# Patient Record
Sex: MALE | Race: BLACK OR AFRICAN AMERICAN | Employment: STUDENT | URBAN - METROPOLITAN AREA
[De-identification: names, ages, dates, MRNs, and addresses within clinical notes are randomized per-mention and may not be internally consistent; named-entity substitution may affect disease eponyms.]

---
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
## 2022-10-07 ENCOUNTER — OFFICE VISIT (OUTPATIENT)
Dept: FAMILY MEDICINE CLINIC | Age: 22
End: 2022-10-07
Payer: MEDICARE

## 2022-10-07 VITALS
TEMPERATURE: 98.6 F | WEIGHT: 201.4 LBS | HEART RATE: 88 BPM | RESPIRATION RATE: 16 BRPM | OXYGEN SATURATION: 99 % | SYSTOLIC BLOOD PRESSURE: 120 MMHG | DIASTOLIC BLOOD PRESSURE: 76 MMHG | BODY MASS INDEX: 24.52 KG/M2 | HEIGHT: 76 IN

## 2022-10-07 DIAGNOSIS — Z00.00 ENCOUNTER FOR ROUTINE HISTORY AND PHYSICAL EXAMINATION: Primary | ICD-10-CM

## 2022-10-07 PROCEDURE — 99385 PREV VISIT NEW AGE 18-39: CPT | Performed by: NURSE PRACTITIONER

## 2022-10-07 PROCEDURE — G8484 FLU IMMUNIZE NO ADMIN: HCPCS | Performed by: NURSE PRACTITIONER

## 2022-10-07 SDOH — ECONOMIC STABILITY: FOOD INSECURITY: WITHIN THE PAST 12 MONTHS, YOU WORRIED THAT YOUR FOOD WOULD RUN OUT BEFORE YOU GOT MONEY TO BUY MORE.: NEVER TRUE

## 2022-10-07 SDOH — ECONOMIC STABILITY: FOOD INSECURITY: WITHIN THE PAST 12 MONTHS, THE FOOD YOU BOUGHT JUST DIDN'T LAST AND YOU DIDN'T HAVE MONEY TO GET MORE.: NEVER TRUE

## 2022-10-07 ASSESSMENT — PATIENT HEALTH QUESTIONNAIRE - PHQ9
SUM OF ALL RESPONSES TO PHQ QUESTIONS 1-9: 0
SUM OF ALL RESPONSES TO PHQ9 QUESTIONS 1 & 2: 0
SUM OF ALL RESPONSES TO PHQ QUESTIONS 1-9: 0
SUM OF ALL RESPONSES TO PHQ QUESTIONS 1-9: 0
2. FEELING DOWN, DEPRESSED OR HOPELESS: 0
SUM OF ALL RESPONSES TO PHQ QUESTIONS 1-9: 0
1. LITTLE INTEREST OR PLEASURE IN DOING THINGS: 0

## 2022-10-07 ASSESSMENT — ENCOUNTER SYMPTOMS
SHORTNESS OF BREATH: 0
COUGH: 0
GASTROINTESTINAL NEGATIVE: 1
CHEST TIGHTNESS: 0
EYES NEGATIVE: 1
ALLERGIC/IMMUNOLOGIC NEGATIVE: 1
WHEEZING: 0

## 2022-10-07 ASSESSMENT — SOCIAL DETERMINANTS OF HEALTH (SDOH): HOW HARD IS IT FOR YOU TO PAY FOR THE VERY BASICS LIKE FOOD, HOUSING, MEDICAL CARE, AND HEATING?: NOT VERY HARD

## 2022-10-07 NOTE — PROGRESS NOTES
Alfonzo Chung  2000  22 y.o. SUBJECT DAVID:    No chief complaint on file. Velia Reagan is a 25year old male who is in for a routine history and physical examination required to play sports at [employer]. He denies recent illness or injury. He denies family medical history of young males in the family who have cardiac events before the age of 27. Current Outpatient Medications on File Prior to Visit   Medication Sig Dispense Refill    ibuprofen (ADVIL;MOTRIN) 100 MG/5ML suspension Take 21 mL by mouth every 6 hours as needed for fever. (Patient not taking: Reported on 10/7/2022)      ibuprofen (ADVIL;MOTRIN) 200 MG tablet Take 2 tablets by mouth every 8 hours as needed for Pain. (Patient not taking: Reported on 10/7/2022) 30 tablet 0     No current facility-administered medications on file prior to visit. History reviewed. No pertinent past medical history. No past surgical history on file.   Family History   Problem Relation Age of Onset    Mental Illness Mother      Social History     Socioeconomic History    Marital status: Single     Spouse name: Not on file    Number of children: Not on file    Years of education: Not on file    Highest education level: Not on file   Occupational History    Not on file   Tobacco Use    Smoking status: Never    Smokeless tobacco: Never   Vaping Use    Vaping Use: Never used   Substance and Sexual Activity    Alcohol use: No    Drug use: Never    Sexual activity: Not Currently   Other Topics Concern    Not on file   Social History Narrative    Not on file     Social Determinants of Health     Financial Resource Strain: Low Risk     Difficulty of Paying Living Expenses: Not very hard   Food Insecurity: No Food Insecurity    Worried About Running Out of Food in the Last Year: Never true    Ran Out of Food in the Last Year: Never true   Transportation Needs: Not on file   Physical Activity: Not on file   Stress: Not on file   Social Connections: Not on file Intimate Partner Violence: Not on file   Housing Stability: Not on file       Review of Systems   Constitutional:  Negative for activity change, appetite change, chills, diaphoresis, fatigue, fever and unexpected weight change. HENT: Negative. Eyes: Negative. Respiratory:  Negative for cough, chest tightness, shortness of breath and wheezing. Cardiovascular:  Negative for chest pain and palpitations. Gastrointestinal: Negative. Endocrine: Negative. Genitourinary: Negative. Musculoskeletal: Negative. Skin: Negative. Allergic/Immunologic: Negative. Neurological: Negative. Hematological: Negative. Psychiatric/Behavioral: Negative. OBJECTIVE:     /76 (Site: Right Upper Arm, Position: Sitting, Cuff Size: Large Adult)   Pulse 88   Temp 98.6 °F (37 °C) (Infrared)   Resp 16   Ht 6' 4\" (1.93 m)   Wt 201 lb 6.4 oz (91.4 kg)   SpO2 99%   BMI 24.52 kg/m²     Physical Exam  Vitals reviewed. Constitutional:       General: He is not in acute distress. Appearance: Normal appearance. He is well-developed. He is not ill-appearing or diaphoretic. HENT:      Head: Normocephalic and atraumatic. Right Ear: Tympanic membrane, ear canal and external ear normal. There is no impacted cerumen. Left Ear: Tympanic membrane, ear canal and external ear normal. There is no impacted cerumen. Nose: Nose normal. No congestion or rhinorrhea. Mouth/Throat:      Mouth: Mucous membranes are moist.      Pharynx: Oropharynx is clear. No oropharyngeal exudate or posterior oropharyngeal erythema. Eyes:      General: No scleral icterus. Right eye: No discharge. Left eye: No discharge. Extraocular Movements: Extraocular movements intact. Conjunctiva/sclera: Conjunctivae normal.      Pupils: Pupils are equal, round, and reactive to light. Cardiovascular:      Rate and Rhythm: Normal rate and regular rhythm. Heart sounds: Normal heart sounds. No murmur heard. No friction rub. No gallop. Pulmonary:      Effort: Pulmonary effort is normal. No respiratory distress. Breath sounds: Normal breath sounds. No wheezing. Chest:      Chest wall: No tenderness. Abdominal:      General: Abdomen is flat. Bowel sounds are normal. There is no distension. Palpations: Abdomen is soft. There is no mass. Tenderness: There is no abdominal tenderness. There is no right CVA tenderness or left CVA tenderness. Hernia: No hernia is present. Musculoskeletal:         General: Normal range of motion. Cervical back: Normal range of motion and neck supple. No rigidity or tenderness. Right lower leg: No edema. Left lower leg: No edema. Lymphadenopathy:      Cervical: No cervical adenopathy. Skin:     General: Skin is warm and dry. Neurological:      Mental Status: He is alert and oriented to person, place, and time. Cranial Nerves: No cranial nerve deficit. Motor: No weakness. Gait: Gait normal.      Deep Tendon Reflexes: Reflexes normal.   Psychiatric:         Behavior: Behavior normal.         Thought Content: Thought content normal.         Judgment: Judgment normal.       No results found for requested labs within last 30 days. No results found for: LABA1C, LABMICR, LDLCALC    No results found for: WBC, NEUTROABS, HGB, HCT, MCV, PLT, SEGSABS, LYMPHSABS, MONOSABS, EOSABS, BASOSABS  No results found for: TSH, TSHHS  No results found for: LABALBU, BILITOT, BILIDIR, IBILI, AST, ALT, ALKPHOS          No results found for this visit on 10/07/22. ASSESSMENT AND PLAN:     1. Encounter for routine history and physical examination  - well physical examination    Physical form completed, signed, copy retained for chart. No follow-ups on file. Care discussed with patient. Patient educated on signs and symptoms of exacerbation and when to seek further medical attention.  Advised to call for any problems, questions, or concerns. Patient verbalizes understanding and agrees with plan. Medications reviewed and reconciled. Continue current medications. Appropriate prescriptions are ordered. Risks and benefits of meds are discussed. After visit summary provided.

## 2024-02-07 ENCOUNTER — APPOINTMENT (OUTPATIENT)
Dept: RADIOLOGY | Facility: HOSPITAL | Age: 24
End: 2024-02-07
Payer: MEDICAID

## 2024-02-07 ENCOUNTER — HOSPITAL ENCOUNTER (EMERGENCY)
Facility: HOSPITAL | Age: 24
Discharge: OTHER INSTITUTION | End: 2024-02-08
Attending: STUDENT IN AN ORGANIZED HEALTH CARE EDUCATION/TRAINING PROGRAM
Payer: MEDICAID

## 2024-02-07 DIAGNOSIS — F28 OTHER PSYCHOTIC DISORDER NOT DUE TO SUBSTANCE OR KNOWN PHYSIOLOGICAL CONDITION (MULTI): Primary | ICD-10-CM

## 2024-02-07 DIAGNOSIS — J10.1 INFLUENZA A: ICD-10-CM

## 2024-02-07 LAB
AMPHETAMINES UR QL SCN>1000 NG/ML: NEGATIVE
ANION GAP SERPL CALC-SCNC: 11 MMOL/L
APPEARANCE UR: CLEAR
BARBITURATES UR QL SCN>300 NG/ML: NEGATIVE
BASOPHILS # BLD AUTO: 0.03 X10*3/UL (ref 0–0.1)
BASOPHILS NFR BLD AUTO: 0.6 %
BENZODIAZ UR QL SCN>300 NG/ML: NEGATIVE
BILIRUB UR STRIP.AUTO-MCNC: NEGATIVE MG/DL
BUN SERPL-MCNC: 14 MG/DL (ref 8–25)
BZE UR QL SCN>300 NG/ML: NEGATIVE
CALCIUM SERPL-MCNC: 9.6 MG/DL (ref 8.5–10.4)
CANNABINOIDS UR QL SCN>50 NG/ML: POSITIVE
CHLORIDE SERPL-SCNC: 98 MMOL/L (ref 97–107)
CO2 SERPL-SCNC: 29 MMOL/L (ref 24–31)
COLOR UR: YELLOW
CREAT SERPL-MCNC: 1.1 MG/DL (ref 0.4–1.6)
EGFRCR SERPLBLD CKD-EPI 2021: >90 ML/MIN/1.73M*2
EOSINOPHIL # BLD AUTO: 0.1 X10*3/UL (ref 0–0.7)
EOSINOPHIL NFR BLD AUTO: 2 %
ERYTHROCYTE [DISTWIDTH] IN BLOOD BY AUTOMATED COUNT: 13.7 % (ref 11.5–14.5)
ETHANOL SERPL-MCNC: <0.01 G/DL
FENTANYL+NORFENTANYL UR QL SCN: NEGATIVE
FLUAV RNA RESP QL NAA+PROBE: DETECTED
FLUBV RNA RESP QL NAA+PROBE: NOT DETECTED
GLUCOSE SERPL-MCNC: 86 MG/DL (ref 65–99)
GLUCOSE UR STRIP.AUTO-MCNC: NORMAL MG/DL
HCT VFR BLD AUTO: 47.3 % (ref 41–52)
HGB BLD-MCNC: 15.6 G/DL (ref 13.5–17.5)
IMM GRANULOCYTES # BLD AUTO: 0.01 X10*3/UL (ref 0–0.7)
IMM GRANULOCYTES NFR BLD AUTO: 0.2 % (ref 0–0.9)
KETONES UR STRIP.AUTO-MCNC: NEGATIVE MG/DL
LEUKOCYTE ESTERASE UR QL STRIP.AUTO: NEGATIVE
LYMPHOCYTES # BLD AUTO: 2.08 X10*3/UL (ref 1.2–4.8)
LYMPHOCYTES NFR BLD AUTO: 41.3 %
MCH RBC QN AUTO: 29.3 PG (ref 26–34)
MCHC RBC AUTO-ENTMCNC: 33 G/DL (ref 32–36)
MCV RBC AUTO: 89 FL (ref 80–100)
METHADONE UR QL SCN>300 NG/ML: NEGATIVE
MONOCYTES # BLD AUTO: 0.94 X10*3/UL (ref 0.1–1)
MONOCYTES NFR BLD AUTO: 18.7 %
MUCOUS THREADS #/AREA URNS AUTO: ABNORMAL /LPF
NEUTROPHILS # BLD AUTO: 1.88 X10*3/UL (ref 1.2–7.7)
NEUTROPHILS NFR BLD AUTO: 37.2 %
NITRITE UR QL STRIP.AUTO: NEGATIVE
NRBC BLD-RTO: 0 /100 WBCS (ref 0–0)
OPIATES UR QL SCN>300 NG/ML: NEGATIVE
OXYCODONE UR QL: NEGATIVE
PCP UR QL SCN>25 NG/ML: NEGATIVE
PH UR STRIP.AUTO: 6 [PH]
PLATELET # BLD AUTO: 157 X10*3/UL (ref 150–450)
POTASSIUM SERPL-SCNC: 4 MMOL/L (ref 3.4–5.1)
PROT UR STRIP.AUTO-MCNC: ABNORMAL MG/DL
RBC # BLD AUTO: 5.32 X10*6/UL (ref 4.5–5.9)
RBC # UR STRIP.AUTO: NEGATIVE /UL
RBC #/AREA URNS AUTO: ABNORMAL /HPF
SARS-COV-2 RNA RESP QL NAA+PROBE: NOT DETECTED
SODIUM SERPL-SCNC: 138 MMOL/L (ref 133–145)
SP GR UR STRIP.AUTO: 1.03
UROBILINOGEN UR STRIP.AUTO-MCNC: NORMAL MG/DL
WBC # BLD AUTO: 5 X10*3/UL (ref 4.4–11.3)
WBC #/AREA URNS AUTO: ABNORMAL /HPF

## 2024-02-07 PROCEDURE — 73140 X-RAY EXAM OF FINGER(S): CPT | Mod: LT

## 2024-02-07 PROCEDURE — 73140 X-RAY EXAM OF FINGER(S): CPT | Mod: LEFT SIDE | Performed by: RADIOLOGY

## 2024-02-07 PROCEDURE — 90839 PSYTX CRISIS INITIAL 60 MIN: CPT

## 2024-02-07 PROCEDURE — 85025 COMPLETE CBC W/AUTO DIFF WBC: CPT | Performed by: STUDENT IN AN ORGANIZED HEALTH CARE EDUCATION/TRAINING PROGRAM

## 2024-02-07 PROCEDURE — 99285 EMERGENCY DEPT VISIT HI MDM: CPT | Performed by: STUDENT IN AN ORGANIZED HEALTH CARE EDUCATION/TRAINING PROGRAM

## 2024-02-07 PROCEDURE — 2500000004 HC RX 250 GENERAL PHARMACY W/ HCPCS (ALT 636 FOR OP/ED): Performed by: STUDENT IN AN ORGANIZED HEALTH CARE EDUCATION/TRAINING PROGRAM

## 2024-02-07 PROCEDURE — 80048 BASIC METABOLIC PNL TOTAL CA: CPT | Performed by: STUDENT IN AN ORGANIZED HEALTH CARE EDUCATION/TRAINING PROGRAM

## 2024-02-07 PROCEDURE — 80307 DRUG TEST PRSMV CHEM ANLYZR: CPT | Performed by: STUDENT IN AN ORGANIZED HEALTH CARE EDUCATION/TRAINING PROGRAM

## 2024-02-07 PROCEDURE — 81001 URINALYSIS AUTO W/SCOPE: CPT | Mod: 59 | Performed by: STUDENT IN AN ORGANIZED HEALTH CARE EDUCATION/TRAINING PROGRAM

## 2024-02-07 PROCEDURE — 36415 COLL VENOUS BLD VENIPUNCTURE: CPT | Performed by: STUDENT IN AN ORGANIZED HEALTH CARE EDUCATION/TRAINING PROGRAM

## 2024-02-07 PROCEDURE — 82077 ASSAY SPEC XCP UR&BREATH IA: CPT | Performed by: STUDENT IN AN ORGANIZED HEALTH CARE EDUCATION/TRAINING PROGRAM

## 2024-02-07 PROCEDURE — 87636 SARSCOV2 & INF A&B AMP PRB: CPT | Performed by: STUDENT IN AN ORGANIZED HEALTH CARE EDUCATION/TRAINING PROGRAM

## 2024-02-07 RX ORDER — LORAZEPAM 2 MG/ML
2 INJECTION INTRAMUSCULAR ONCE
Status: COMPLETED | OUTPATIENT
Start: 2024-02-07 | End: 2024-02-07

## 2024-02-07 RX ADMIN — LORAZEPAM 2 MG: 2 INJECTION INTRAMUSCULAR; INTRAVENOUS at 20:29

## 2024-02-07 SDOH — HEALTH STABILITY: MENTAL HEALTH: WISH TO BE DEAD (PAST 1 MONTH): NO

## 2024-02-07 SDOH — HEALTH STABILITY: MENTAL HEALTH: DEPRESSION SYMPTOMS: NO PROBLEMS REPORTED OR OBSERVED.

## 2024-02-07 SDOH — HEALTH STABILITY: MENTAL HEALTH: IN THE PAST FEW WEEKS, HAVE YOU WISHED YOU WERE DEAD?: NO

## 2024-02-07 SDOH — HEALTH STABILITY: MENTAL HEALTH: ANXIETY SYMPTOMS: NO PROBLEMS REPORTED OR OBSERVED.

## 2024-02-07 SDOH — ECONOMIC STABILITY: GENERAL

## 2024-02-07 SDOH — HEALTH STABILITY: MENTAL HEALTH: IN THE PAST FEW WEEKS, HAVE YOU FELT THAT YOU OR YOUR FAMILY WOULD BE BETTER OFF IF YOU WERE DEAD?: NO

## 2024-02-07 SDOH — HEALTH STABILITY: MENTAL HEALTH: SUICIDAL BEHAVIOR (LIFETIME): NO

## 2024-02-07 SDOH — HEALTH STABILITY: MENTAL HEALTH: NON-SPECIFIC ACTIVE SUICIDAL THOUGHTS (PAST 1 MONTH): NO

## 2024-02-07 SDOH — ECONOMIC STABILITY: HOUSING INSECURITY: FEELS SAFE LIVING IN HOME: YES

## 2024-02-07 SDOH — HEALTH STABILITY: MENTAL HEALTH: HAVE YOU EVER TRIED TO KILL YOURSELF?: NO

## 2024-02-07 SDOH — HEALTH STABILITY: MENTAL HEALTH: ARE YOU HAVING THOUGHTS OF KILLING YOURSELF RIGHT NOW?: NO

## 2024-02-07 SDOH — HEALTH STABILITY: MENTAL HEALTH: IN THE PAST WEEK, HAVE YOU BEEN HAVING THOUGHTS ABOUT KILLING YOURSELF?: NO

## 2024-02-07 SDOH — HEALTH STABILITY: MENTAL HEALTH: BEHAVIORS/MOOD: AGITATED;HALLUCINATIONS

## 2024-02-07 ASSESSMENT — COLUMBIA-SUICIDE SEVERITY RATING SCALE - C-SSRS
1. IN THE PAST MONTH, HAVE YOU WISHED YOU WERE DEAD OR WISHED YOU COULD GO TO SLEEP AND NOT WAKE UP?: NO
2. HAVE YOU ACTUALLY HAD ANY THOUGHTS OF KILLING YOURSELF?: NO
6. HAVE YOU EVER DONE ANYTHING, STARTED TO DO ANYTHING, OR PREPARED TO DO ANYTHING TO END YOUR LIFE?: NO

## 2024-02-07 ASSESSMENT — PAIN SCALES - GENERAL
PAINLEVEL_OUTOF10: 0 - NO PAIN
PAINLEVEL_OUTOF10: 0 - NO PAIN

## 2024-02-07 ASSESSMENT — LIFESTYLE VARIABLES
PRESCIPTION_ABUSE_PAST_12_MONTHS: NO
HAVE YOU EVER FELT YOU SHOULD CUT DOWN ON YOUR DRINKING: NO
SUBSTANCE_ABUSE_PAST_12_MONTHS: YES
EVER FELT BAD OR GUILTY ABOUT YOUR DRINKING: NO
HAVE PEOPLE ANNOYED YOU BY CRITICIZING YOUR DRINKING: NO
EVER HAD A DRINK FIRST THING IN THE MORNING TO STEADY YOUR NERVES TO GET RID OF A HANGOVER: NO

## 2024-02-07 ASSESSMENT — PAIN - FUNCTIONAL ASSESSMENT
PAIN_FUNCTIONAL_ASSESSMENT: 0-10
PAIN_FUNCTIONAL_ASSESSMENT: 0-10

## 2024-02-08 ENCOUNTER — APPOINTMENT (OUTPATIENT)
Dept: CARDIOLOGY | Facility: HOSPITAL | Age: 24
End: 2024-02-08
Payer: MEDICAID

## 2024-02-08 VITALS
DIASTOLIC BLOOD PRESSURE: 86 MMHG | HEART RATE: 63 BPM | OXYGEN SATURATION: 98 % | RESPIRATION RATE: 18 BRPM | WEIGHT: 200 LBS | SYSTOLIC BLOOD PRESSURE: 127 MMHG | TEMPERATURE: 98.4 F | BODY MASS INDEX: 24.36 KG/M2 | HEIGHT: 76 IN

## 2024-02-08 PROCEDURE — 2500000004 HC RX 250 GENERAL PHARMACY W/ HCPCS (ALT 636 FOR OP/ED): Performed by: STUDENT IN AN ORGANIZED HEALTH CARE EDUCATION/TRAINING PROGRAM

## 2024-02-08 PROCEDURE — 93005 ELECTROCARDIOGRAM TRACING: CPT

## 2024-02-08 RX ORDER — LORAZEPAM 2 MG/ML
2 INJECTION INTRAMUSCULAR ONCE
Status: DISCONTINUED | OUTPATIENT
Start: 2024-02-08 | End: 2024-02-08 | Stop reason: HOSPADM

## 2024-02-08 RX ORDER — ZIPRASIDONE MESYLATE 20 MG/ML
20 INJECTION, POWDER, LYOPHILIZED, FOR SOLUTION INTRAMUSCULAR ONCE
Status: COMPLETED | OUTPATIENT
Start: 2024-02-08 | End: 2024-02-08

## 2024-02-08 RX ADMIN — ZIPRASIDONE MESYLATE 20 MG: 20 INJECTION, POWDER, LYOPHILIZED, FOR SOLUTION INTRAMUSCULAR at 01:40

## 2024-02-08 NOTE — ED NOTES
Patient agitated but able to be redirected at this time to room. Patient upset that he is pink slipped and can't leave.      Jerrod Villalpando RN  02/07/24 3976

## 2024-02-08 NOTE — PROGRESS NOTES
Patient care accepted from Dr. Rahman at 0700 but the patient medically cleared but awaiting placement for inpatient mental health care.      The patient is a 23-year-old male presenting to the emergency department for evaluation of psychosis and reported homicidal statements.  The patient reportedly does have some history of underlying psychosis with paranoia and manic behaviors and reportedly has been making some statements threatening teammates for the past several days.  The patient's grandmother does report that he was previously admitted for inpatient mental health care due to similar symptoms.  She states that his mother recently  and this seems to have triggered his behaviors again.  The patient denies any homicidal or suicidal ideation.  He denies any hallucinations.  He denies any headache or visual changes.  No chest pain or shortness of breath.  No abdominal pain.  No nausea or vomiting.  No diarrhea or constipation.  No urinary complaints.  All pertinent positives and negatives are recorded above.  All other systems reviewed and otherwise negative.  Vital signs with diastolic hypertension but otherwise within normal limits.  Physical exam with a well-nourished well-developed male in no acute distress.  HEENT exam within normal limits bradycardia has no evidence of airway compromise or respiratory distress.  Abdominal exam is benign.  He has no gross motor, neurologic or vascular deficits on exam.  He does report some pain with palpation of his left fingers.      EKG with normal sinus rhythm at 74 bpm, normal axis, normal voltage, normal ST segment, normal T waves      Diagnostic labs with evidence of substance abuse and influenza A but otherwise unremarkable.      COVID-19 testing negative      XR fingers left 2+ views   Final Result   No detectable abnormality.   Signed by David Demarco MD           Crisis intervention was consulted and will attempt placement for inpatient mental health  care.      Impression/diagnosis  Psychosis, acute on chronic  Homicidal ideation  Influenza A  Substance abuse      I reviewed the results of the diagnostic labs and diagnostic imaging.  Formal radiology reading was completed by the radiologist      The patient was transferred for inpatient mental health care.  He was accepted at [facility] by Dr.Haxhru-Erhardt Yesika Prince MD

## 2024-02-08 NOTE — ED NOTES
Patient continually coming out of room, threatening to leave, patient becoming agitated. MD to order salty.      Jerrod Villalpando, RN  02/08/24 0148

## 2024-02-08 NOTE — PROGRESS NOTES
Patient was initially seen by my colleague and endorsed to me on signout.    Briefly, patient is a 23-year-old male that presents to the emergency room for psychiatric evaluation.  Patient was seen, evaluated and medically cleared by my colleague.  Patient was placed on involuntary hold by my colleague due to his apparent acute psychosis and homicidal thoughts.  He is internally stimulated on exam and was signed out to me pending placement.  Patient did become very really agitated and not redirectable.  He was given IM Geodon with improvement of his agitation and aggression.  Patient resting comfortably on reevaluation.  He was signed out to oncoming physician pending patient placement.

## 2024-02-08 NOTE — ED PROVIDER NOTES
"HPI   Chief Complaint   Patient presents with    Psychiatric Evaluation     Patient sent in for mental evaluation. Patient was states he said some stuff on a bus that scared people. Patient states \"you'll have to as them, I dont know what I said\" patient admits to smoking marijuana 2 days ago. Patient laughing during triage       HPI  See my MDM                  [place] Coma Scale Score: 15                     Patient History   No past medical history on file.  No past surgical history on file.  No family history on file.  Social History     Tobacco Use    Smoking status: Not on file    Smokeless tobacco: Not on file   Substance Use Topics    Alcohol use: Not on file    Drug use: Not on file       Physical Exam   ED Triage Vitals [02/07/24 2021]   Temperature Heart Rate Respirations BP   36.9 °C (98.4 °F) 63 18 (!) 148/93      Pulse Ox Temp Source Heart Rate Source Patient Position   99 % Tympanic Brachial Sitting      BP Location FiO2 (%)     Left arm --       Physical Exam  CONSTITUTIONAL: Vital signs reviewed as charted, well-developed and in no distress  Eyes: Extraocular muscles are intact. Pupils equal round and reactive to light. Conjunctiva are pink.    ENT: Mucous membranes are moist. Tongue in the midline. Pharynx was without erythema or exudates, uvula midline  LUNGS: Breath sounds equal and clear to auscultation. Good air exchange, no wheezes rales or retractions, pulse oximetry is charted.  HEART: Regular rate and rhythm without murmur thrill or rub, strong tones, auscultation is normal.  ABDOMEN: Soft and nontender without guarding rebound rigidity or mass. Bowel sounds are present and normal in all quadrants. There is no palpable masses or aneurysms identified. No hepatosplenomegaly, normal abdominal exam.  Neuro: The patient is awake, alert and moving all 4 extremities  MUSCULOSKELETAL: The calves are nontender to palpation. Full gross active range of motion.   PSYCH: Awake alert patient is " experiencing some psychosis, grandiose behavior  Skin:  Dry, normal color, warm to the touch, no rash present.      ED Course & MDM   Diagnoses as of 02/07/24 2249   Other psychotic disorder not due to substance or known physiological condition (CMS/[facility])       Medical Decision Making  History obtained from: patient    Vital signs, nursing notes, current medications, past medical history, Surgical history, allergies, social history, family History were reviewed.         HPI:  Patient 23-year-old male brought to the emergency room today after he is making some odd comments while on the bus today.  He is having some form of psychosis.  Denies suicidal homicidal ideation.  Denies fever chills or night sweats.  Denies nausea vomiting diarrhea.  Vital signs are positive hypertensive but otherwise unremarkable.      10 point ROS was reviewed and negative except Noted above in HPI.  DDX: as listed above          MDM Summary/considerations:  Care transferred to Dr. Rahman pending psychiatric evaluation.  Patient medically cleared at this time.  Workup was positive for influenza A but otherwise grossly unremarkable.        I saw this patient in conjunction with Dr. Haile, please see her supervision note.          This chart was completed using voice recognition transcription software. Please excuse any errors of transcription including grammatical, punctuation, syntax and spelling errors.  Please contact me with any questions regarding this chart.    Procedure  Procedures     BERT Mays-CNP  02/07/24 0753

## 2024-02-08 NOTE — PROGRESS NOTES
Social Work Note  Patient accepted at [facility], awaiting bed availability.    14:16 Pt accepted at [facility] by Dr Yanez, floor 3B Bed #316. Sw provided nursing report to nurse.
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
## 2024-02-08 NOTE — ED NOTES
"Patient becoming agitated while  speaking with patient. Patient wanting to leave. Patient becoming upset and stating \"I'm going to leave\" \"I want my belongings and I need to leave\"     Jerrod Villalpando RN  02/07/24 4145    "

## 2024-02-08 NOTE — ED NOTES
Patient becoming agitated and worked out. Patient unable to be redirected at this time. Patient repeating single words over and over. MD to order medications     Jerrod Villalpando RN  02/07/24 7300

## 2024-02-08 NOTE — PROGRESS NOTES
"Social Work Note    SW gathered collateral info from [facility].       [facility] clinician reports there has been various concerns about pt's behavior.    - Grandmother called [facility] about pt's erratic behavior    - pt's school psychologist also contacted [facility]. Pt was seen walking on a freeway.  Also pt made \"homicidal comments\" about family members and team members. [facility] reports pt information being provided about delusions and fixations.  Pt is preoccupied about with trauma history concerning mother and grandmother.     Pt denies any family associations. SW unable to gather additional information. Pt denied staff to speak with grandmother.     Pt requires a psych eval in order to return to school.  Pt as this time not cooperative with medical staff.     No psych hx reported.  Pt does not have established care. [facility] reported a \"mental break down\" that happened last year per grandmother.     Pt's mother reportedly had dx of schizophrenia and is now .  Pt has not made mention of mother being , and speaks of her as she is present.       NICOLE and MD in agreement, pt would benefit from inpatient treatment, pending further evaluation.   "

## 2024-02-08 NOTE — PROGRESS NOTES
"Social Work Note    - Pt brought in by [employer] due to mobile crisis intervention. Per mobile crisis, pt made \"threatening\" remarks on a team bus which caused concerns. Mobile crisis reported pt to have grandiose delusions.         SW met with pt.  Difficult to engage. Poor eye contact and concentration.  Pt stated he does not remember what lead him to being place in the ED. Pt is from [facility] [place] a student at [employer] and plays basketball. Pt became fixated on telling SW about his upbringing and issues with his mother. Pt then became agitated refused to speak with SW unless his phone was given back. Pt states it is information/video about his mother he has to show \"for the eval\".     SW attempted to ask permission to speak to roommate or family for collateral.  Pt was unable to provide a contact and was irritable at the time.      Pt could be hard speaking to himself when SW left the room.    Pt unable to be  properly  assessed at this time. SW reviewed information w/ Dr. Haile.  MD plans on medicating pt and assessing needs at a later time.    2100-  Dr. Haile signed pink slip.   "

## 2024-02-08 NOTE — ED NOTES
"RN,  PD and tech in to medicate patient. Patient becoming aggressive towards staff, patient was given IM Geodon as ordered. Patient yelling  \"you all are racist, that's some white cracker shit\"     Jerrod Villalpando RN  02/08/24 0150    "

## 2024-02-08 NOTE — PROGRESS NOTES
Social Work Note    CHANCE Orellana at [facility]. Acceptance pending receipt of pink slip.  TCT MEDHAT Camacho. requesting pink slip be faxed to [facility]. Janice agreed.

## 2024-02-10 LAB
ATRIAL RATE: 74 BPM
P AXIS: 65 DEGREES
P OFFSET: 179 MS
P ONSET: 124 MS
PR INTERVAL: 178 MS
Q ONSET: 213 MS
QRS COUNT: 12 BEATS
QRS DURATION: 90 MS
QT INTERVAL: 382 MS
QTC CALCULATION(BAZETT): 424 MS
QTC FREDERICIA: 410 MS
R AXIS: 63 DEGREES
T AXIS: 28 DEGREES
T OFFSET: 404 MS
VENTRICULAR RATE: 74 BPM